# Patient Record
Sex: MALE | Race: WHITE | Employment: FULL TIME | URBAN - METROPOLITAN AREA
[De-identification: names, ages, dates, MRNs, and addresses within clinical notes are randomized per-mention and may not be internally consistent; named-entity substitution may affect disease eponyms.]

---

## 2020-10-26 ENCOUNTER — OFFICE VISIT (OUTPATIENT)
Dept: FAMILY MEDICINE | Facility: CLINIC | Age: 30
End: 2020-10-26
Payer: COMMERCIAL

## 2020-10-26 VITALS
SYSTOLIC BLOOD PRESSURE: 136 MMHG | WEIGHT: 231 LBS | HEART RATE: 79 BPM | RESPIRATION RATE: 16 BRPM | TEMPERATURE: 97.8 F | BODY MASS INDEX: 31.29 KG/M2 | DIASTOLIC BLOOD PRESSURE: 86 MMHG | OXYGEN SATURATION: 99 % | HEIGHT: 72 IN

## 2020-10-26 DIAGNOSIS — Z71.1 CONCERN ABOUT STD IN MALE WITHOUT DIAGNOSIS: Primary | ICD-10-CM

## 2020-10-26 DIAGNOSIS — Z20.6 HIV EXPOSURE: ICD-10-CM

## 2020-10-26 DIAGNOSIS — Z23 NEED FOR VACCINATION: ICD-10-CM

## 2020-10-26 LAB
CREAT SERPL-MCNC: 0.72 MG/DL (ref 0.66–1.25)
GFR SERPL CREATININE-BSD FRML MDRD: >90 ML/MIN/{1.73_M2}
HIV 1+2 AB+HIV1 P24 AG SERPL QL IA: NONREACTIVE
T PALLIDUM AB SER QL: NONREACTIVE

## 2020-10-26 PROCEDURE — 82565 ASSAY OF CREATININE: CPT | Performed by: FAMILY MEDICINE

## 2020-10-26 PROCEDURE — 99203 OFFICE O/P NEW LOW 30 MIN: CPT | Mod: 25 | Performed by: FAMILY MEDICINE

## 2020-10-26 PROCEDURE — 87591 N.GONORRHOEAE DNA AMP PROB: CPT | Performed by: FAMILY MEDICINE

## 2020-10-26 PROCEDURE — 36415 COLL VENOUS BLD VENIPUNCTURE: CPT | Performed by: FAMILY MEDICINE

## 2020-10-26 PROCEDURE — 90686 IIV4 VACC NO PRSV 0.5 ML IM: CPT | Performed by: FAMILY MEDICINE

## 2020-10-26 PROCEDURE — 86780 TREPONEMA PALLIDUM: CPT | Mod: 90 | Performed by: FAMILY MEDICINE

## 2020-10-26 PROCEDURE — 87491 CHLMYD TRACH DNA AMP PROBE: CPT | Performed by: FAMILY MEDICINE

## 2020-10-26 PROCEDURE — 90471 IMMUNIZATION ADMIN: CPT | Performed by: FAMILY MEDICINE

## 2020-10-26 PROCEDURE — 87389 HIV-1 AG W/HIV-1&-2 AB AG IA: CPT | Performed by: FAMILY MEDICINE

## 2020-10-26 PROCEDURE — 99000 SPECIMEN HANDLING OFFICE-LAB: CPT | Performed by: FAMILY MEDICINE

## 2020-10-26 RX ORDER — AZITHROMYCIN 500 MG/1
1000 TABLET, FILM COATED ORAL DAILY
Qty: 2 TABLET | Refills: 0 | Status: SHIPPED | OUTPATIENT
Start: 2020-10-26 | End: 2020-10-27

## 2020-10-26 RX ORDER — EMTRICITABINE AND TENOFOVIR DISOPROXIL FUMARATE 200; 300 MG/1; MG/1
1 TABLET, FILM COATED ORAL DAILY
Qty: 90 TABLET | Refills: 0 | Status: SHIPPED | OUTPATIENT
Start: 2020-10-26

## 2020-10-26 ASSESSMENT — MIFFLIN-ST. JEOR: SCORE: 2040.81

## 2020-10-26 NOTE — PROGRESS NOTES
Subjective     Christofer Brower is a 29 year old male who presents to clinic today for the following health issues:    HPI       Pt states he was with a new partner last week and then got a call stating that he was starting to have symptoms patient would like to get tested for everything.     Receptive rectal intercourse    PrEP is for  anyone who is in an ongoing relationship with an HIV-positive partner. It also includes anyone who 1) is not in a mutually monogamous relationship with a partner who recently tested HIV-negative, and   2) is a rubin or bisexual man who has had anal sex without a condom or been diagnosed with an STD in the past 6 months; or  heterosexual man or woman who does not regularly use condoms during sex with partners of unknown HIV status who are at substantial risk of HIV infection (e.g., people who inject drugs or have bisexual male partners).        There is no problem list on file for this patient.    Past Surgical History:   Procedure Laterality Date     HERNIA REPAIR         Social History     Tobacco Use     Smoking status: Former Smoker     Smokeless tobacco: Never Used   Substance Use Topics     Alcohol use: Yes     Comment: Social      Family History   Problem Relation Age of Onset     Family History Negative Mother      Family History Negative Father          Allergies, reviewed:   No Known Allergies    Current Outpatient Medications   Medication Sig Dispense Refill     azithromycin (ZITHROMAX) 500 MG tablet Take 2 tablets (1,000 mg) by mouth daily for 1 day 2 tablet 0     emtricitabine-tenofovir (TRUVADA) 200-300 MG per tablet Take 1 tablet by mouth daily 90 tablet 0           ROS: As per HPI.  Constitutional: no fevers/sweats/chills, no weight changes  GI: no nausea/vomiting/diarrhea, normal stooling  GYN: no penile discharge    EXAM  /86   Pulse 79   Temp 97.8  F (36.6  C) (Tympanic)   Resp 16   Ht 1.829 m (6')   Wt 104.8 kg (231 lb)   SpO2 99%   BMI 31.33 kg/m     General Appearance: Pleasant, alert, in no acute respiratory distress.  Head Exam: Normal.   Eye Exam: Normal external eye,  lids. GLORIA.  Ear Exam: Normal   external ears.  OroPharynx Exam: Dental hygiene normal, no signs of xerostomia  Thyroid Exam: No obvious nodules or enlargement   Chest/Respiratory Exam: Normal, comfortable, easy respirations   Cardiovascular Exam: normal color, circulation,   Musculoskeletal Exam: full ROM of upper and lower extremities.  Skin: no rashes  Rectal: normal no lesions  Neurologic Exam: Nonfocal, no tremor. Normal gait.  Psychiatric Exam: Alert - appropriate, normal affect      No results found for this or any previous visit (from the past 24 hour(s)).    ASSESSMENT/PLAN:      ICD-10-CM    1. Concern about STD in male without diagnosis  Z71.1 HIV Antigen Antibody Combo     Treponema Abs w Reflex to RPR and Titer     azithromycin (ZITHROMAX) 500 MG tablet     HIV Antigen Antibody Combo     Creatinine     Neisseria gonorrhoeae PCR     Chlamydia trachomatis PCR   2. HIV exposure  Z20.6 Creatinine     HIV Antigen Antibody Combo     Creatinine     emtricitabine-tenofovir (TRUVADA) 200-300 MG per tablet   3. Need for vaccination  Z23 HC FLU VAC PRESRV FREE QUAD SPLIT VIR > 6 MONTHS IM       See instructions    No follow-ups on file.      http://www.cdc.gov/hiv/pdf/opspdkhtvkqxbxlknxmkrf6538.pdf  Lab Tests/Evaluation   Completed high risk evaluation of uninfected individual   Confirmed a negative HIV-1 test immediately prior to initiating TRUVADA for a PrEP indication   Performed HBV screening test if not    Confirmed estimated creatinine clearance (CrCl) >60 mL/min prior to initiation and periodically during treatment.  In patients at risk for renal dysfunction, assess estimated CrCl, serum phosphorus, urine glucose, and urine  protein before initiation of TRUVADA and periodically while TRUVADA is being used. If a decrease in estimated CrCl  is observed in uninfected individuals while  using TRUVADA for a PrEP indication, evaluate potential causes and  reassess potential risks and benefits of continued use   Confirmed that the uninfected individual at high risk is not taking other HIV-1 medications or HBV medications   Evaluated risk/benefit for women who may be pregnant or may want to become pregnant  Counseling/Follow-up   Discussed known safety risks with use of TRUVADA for a PrEP indication   Counseled on the importance of scheduled follow-up every 2 to 3 months, including regular HIV-1 screening tests  (at least every 3 months), while taking TRUVADA for a PrEP indication to reconfirm ADF-8-casreqte status   Discussed the importance of discontinuing TRUVADA for a PrEP indication if seroconversion has occurred, to reduce  the development of resistant HIV-1 variants   Counseled on the importance of adherence to daily dosing schedule   Counseled that TRUVADA for a PrEP indication should be used only as part of a comprehensive prevention strategy   Educated on practicing safer sex consistently and using condoms correctly   Discussed the importance of the individual knowing their HIV-1 status and, if possible, that of their partner(s)   Discussed the importance of and performed screening for sexually transmitted infections (STIs), such as syphilis  and gonorrhea, that can facilitate HIV-1 transmission   Offered HBV vaccination as appropriate   Provided education on where information about TRUVADA for a PrEP indication can be accessed   Discussed potential adverse reactions   Reviewed the TRUVADA Medication Guide with the uninfected individual at high risk

## 2020-10-26 NOTE — LETTER
November 6, 2020      Christofer PARISH Inocente  [address]    [facility] [address]        Dear ,    We are writing to inform you of your test results.    Your test results fall within the expected range(s) or remain unchanged from previous results.  Please continue with current treatment plan.    Your tests came back negative.    Resulted Orders   HIV Antigen Antibody Combo   Result Value Ref Range    HIV Antigen Antibody Combo Nonreactive NR^Nonreactive          Comment:      HIV-1 p24 Ag & HIV-1/HIV-2 Ab Not Detected   Treponema Abs w Reflex to RPR and Titer   Result Value Ref Range    Treponema Antibodies Nonreactive NR^Nonreactive      Comment:      Methodology Change: Test performed on the [employer] LiaMiTÃº XL by Treponema   pallidum Total Antibodies Assay as of 3.17.2020.     Creatinine   Result Value Ref Range    Creatinine 0.72 0.66 - 1.25 mg/dL    GFR Estimate >90 >60 mL/min/[1.73_m2]      Comment:      Non  GFR Calc  Starting 12/18/2018, serum creatinine based estimated GFR (eGFR) will be   calculated using the Chronic Kidney Disease Epidemiology Collaboration   (CKD-EPI) equation.      GFR Estimate If Black >90 >60 mL/min/[1.73_m2]      Comment:       GFR Calc  Starting 12/18/2018, serum creatinine based estimated GFR (eGFR) will be   calculated using the Chronic Kidney Disease Epidemiology Collaboration   (CKD-EPI) equation.     Neisseria gonorrhoeae PCR   Result Value Ref Range    Specimen Descrip Rectal     N Gonorrhea PCR Negative NEG^Negative      Comment:      Negative for N. gonorrhoeae rRNA by transcription mediated amplification.  A negative result by transcription mediated amplification does not preclude   the presence of N. gonorrhoeae infection because results are dependent on   proper and adequate collection, absence of inhibitors, and sufficient rRNA to   be detected.     Chlamydia trachomatis PCR   Result Value Ref Range    Specimen Description  Rectal     Chlamydia Trachomatis PCR Negative NEG^Negative      Comment:      Negative for C. trachomatis rRNA by transcription mediated amplification.  A negative result by transcription mediated amplification does not preclude   the presence of C. trachomatis infection because results are dependent on   proper and adequate collection, absence of inhibitors, and sufficient rRNA to   be detected.         If you have any questions or concerns, please call the clinic at the number listed above.       Sincerely,        Akira Brown MD

## 2020-10-27 LAB
C TRACH DNA SPEC QL NAA+PROBE: NEGATIVE
N GONORRHOEA DNA SPEC QL NAA+PROBE: NEGATIVE
SPECIMEN SOURCE: NORMAL
SPECIMEN SOURCE: NORMAL

## 2021-01-09 ENCOUNTER — HEALTH MAINTENANCE LETTER (OUTPATIENT)
Age: 31
End: 2021-01-09

## 2021-10-11 ENCOUNTER — HEALTH MAINTENANCE LETTER (OUTPATIENT)
Age: 31
End: 2021-10-11

## 2022-01-30 ENCOUNTER — HEALTH MAINTENANCE LETTER (OUTPATIENT)
Age: 32
End: 2022-01-30

## 2022-09-24 ENCOUNTER — HEALTH MAINTENANCE LETTER (OUTPATIENT)
Age: 32
End: 2022-09-24

## 2023-05-08 ENCOUNTER — HEALTH MAINTENANCE LETTER (OUTPATIENT)
Age: 33
End: 2023-05-08